# Patient Record
Sex: MALE | Race: OTHER | Employment: FULL TIME | URBAN - METROPOLITAN AREA
[De-identification: names, ages, dates, MRNs, and addresses within clinical notes are randomized per-mention and may not be internally consistent; named-entity substitution may affect disease eponyms.]

---

## 2017-01-12 ENCOUNTER — HOSPITAL ENCOUNTER (EMERGENCY)
Age: 52
Discharge: HOME | End: 2017-01-12
Attending: EMERGENCY MEDICINE
Payer: COMMERCIAL

## 2017-01-12 ENCOUNTER — APPOINTMENT (OUTPATIENT)
Dept: GENERAL RADIOLOGY | Age: 52
End: 2017-01-12
Attending: EMERGENCY MEDICINE
Payer: COMMERCIAL

## 2017-01-12 VITALS
WEIGHT: 190 LBS | HEART RATE: 69 BPM | TEMPERATURE: 97.8 F | BODY MASS INDEX: 31.62 KG/M2 | RESPIRATION RATE: 16 BRPM | SYSTOLIC BLOOD PRESSURE: 141 MMHG | OXYGEN SATURATION: 97 % | DIASTOLIC BLOOD PRESSURE: 83 MMHG

## 2017-01-12 DIAGNOSIS — R07.9 CHEST PAIN, UNSPECIFIED TYPE: Primary | ICD-10-CM

## 2017-01-12 LAB
ALBUMIN SERPL BCP-MCNC: 4 G/DL (ref 3.5–5)
ALBUMIN/GLOB SERPL: 1.2 {RATIO} (ref 1.2–3.5)
ALP SERPL-CCNC: 93 U/L (ref 50–136)
ALT SERPL-CCNC: 60 U/L (ref 12–65)
ANION GAP BLD CALC-SCNC: 5 MMOL/L (ref 7–16)
AST SERPL W P-5'-P-CCNC: 36 U/L (ref 15–37)
ATRIAL RATE: 76 BPM
BASOPHILS # BLD AUTO: 0.1 K/UL (ref 0–0.2)
BASOPHILS # BLD: 1 % (ref 0–2)
BILIRUB SERPL-MCNC: 0.5 MG/DL (ref 0.2–1.1)
BUN SERPL-MCNC: 13 MG/DL (ref 6–23)
CALCIUM SERPL-MCNC: 8.8 MG/DL (ref 8.3–10.4)
CALCULATED P AXIS, ECG09: 57 DEGREES
CALCULATED R AXIS, ECG10: 48 DEGREES
CALCULATED T AXIS, ECG11: 52 DEGREES
CHLORIDE SERPL-SCNC: 103 MMOL/L (ref 98–107)
CO2 SERPL-SCNC: 30 MMOL/L (ref 21–32)
CREAT SERPL-MCNC: 0.97 MG/DL (ref 0.8–1.5)
DIAGNOSIS, 93000: NORMAL
DIASTOLIC BP, ECG02: NORMAL MMHG
DIFFERENTIAL METHOD BLD: ABNORMAL
EOSINOPHIL # BLD: 0.8 K/UL (ref 0–0.8)
EOSINOPHIL NFR BLD: 12 % (ref 0.5–7.8)
ERYTHROCYTE [DISTWIDTH] IN BLOOD BY AUTOMATED COUNT: 12.5 % (ref 11.9–14.6)
GLOBULIN SER CALC-MCNC: 3.3 G/DL (ref 2.3–3.5)
GLUCOSE SERPL-MCNC: 106 MG/DL (ref 65–100)
HCT VFR BLD AUTO: 41.1 % (ref 41.1–50.3)
HGB BLD-MCNC: 14.7 G/DL (ref 13.6–17.2)
IMM GRANULOCYTES # BLD: 0 K/UL (ref 0–0.5)
IMM GRANULOCYTES NFR BLD AUTO: 0.1 % (ref 0–5)
LYMPHOCYTES # BLD AUTO: 34 % (ref 13–44)
LYMPHOCYTES # BLD: 2.4 K/UL (ref 0.5–4.6)
MCH RBC QN AUTO: 30.2 PG (ref 26.1–32.9)
MCHC RBC AUTO-ENTMCNC: 35.8 G/DL (ref 31.4–35)
MCV RBC AUTO: 84.4 FL (ref 79.6–97.8)
MONOCYTES # BLD: 0.6 K/UL (ref 0.1–1.3)
MONOCYTES NFR BLD AUTO: 8 % (ref 4–12)
NEUTS SEG # BLD: 3.1 K/UL (ref 1.7–8.2)
NEUTS SEG NFR BLD AUTO: 45 % (ref 43–78)
P-R INTERVAL, ECG05: 150 MS
PLATELET # BLD AUTO: 306 K/UL (ref 150–450)
PMV BLD AUTO: 10.1 FL (ref 10.8–14.1)
POTASSIUM SERPL-SCNC: 3.9 MMOL/L (ref 3.5–5.1)
PROT SERPL-MCNC: 7.3 G/DL (ref 6.3–8.2)
Q-T INTERVAL, ECG07: 378 MS
QRS DURATION, ECG06: 88 MS
QTC CALCULATION (BEZET), ECG08: 425 MS
RBC # BLD AUTO: 4.87 M/UL (ref 4.23–5.67)
SODIUM SERPL-SCNC: 138 MMOL/L (ref 136–145)
SYSTOLIC BP, ECG01: NORMAL MMHG
TROPONIN I SERPL-MCNC: <0.04 NG/ML (ref 0.02–0.05)
VENTRICULAR RATE, ECG03: 76 BPM
WBC # BLD AUTO: 7 K/UL (ref 4.3–11.1)

## 2017-01-12 PROCEDURE — 99285 EMERGENCY DEPT VISIT HI MDM: CPT | Performed by: EMERGENCY MEDICINE

## 2017-01-12 PROCEDURE — 71010 XR CHEST SNGL V: CPT

## 2017-01-12 PROCEDURE — 84484 ASSAY OF TROPONIN QUANT: CPT | Performed by: EMERGENCY MEDICINE

## 2017-01-12 PROCEDURE — 80053 COMPREHEN METABOLIC PANEL: CPT | Performed by: EMERGENCY MEDICINE

## 2017-01-12 PROCEDURE — 85025 COMPLETE CBC W/AUTO DIFF WBC: CPT | Performed by: EMERGENCY MEDICINE

## 2017-01-12 PROCEDURE — 93005 ELECTROCARDIOGRAM TRACING: CPT | Performed by: EMERGENCY MEDICINE

## 2017-01-12 RX ORDER — CLONIDINE HYDROCHLORIDE 0.1 MG/1
0.2 TABLET ORAL
Status: DISCONTINUED | OUTPATIENT
Start: 2017-01-12 | End: 2017-01-12 | Stop reason: HOSPADM

## 2017-01-12 NOTE — ED PROVIDER NOTES
HPI Comments: 45 yo male with a history of HTN who has been noncompliant with his medication presents to ED with intermittent left lateral chest/shoulder pain since yesterday. Patient describes \"poking\" pain that lasts a second or two and occurs every few minutes. Pain has eased up since last night. He reports left arm numbness with the pain. No other symptoms. No palpitations, diaphoresis, nausea/vomiting, or shortness of breath. No abdominal or back pain. No other focal numbness. No focal weakness. Patient is currently not having symptoms. He denies cardiac history. Patient is a 46 y.o. male presenting with chest pain. The history is provided by the patient and a relative. Chest Pain (Angina)    Associated symptoms include numbness. Pertinent negatives include no abdominal pain, no dizziness, no fever, no nausea, no palpitations, no shortness of breath, no vomiting and no weakness. Past Medical History:   Diagnosis Date    HTN (hypertension)        Past Surgical History:   Procedure Laterality Date    Hx shoulder arthroscopy Left          Family History:   Problem Relation Age of Onset    Stroke Mother     Hypertension Father        Social History     Social History    Marital status:      Spouse name: N/A    Number of children: N/A    Years of education: N/A     Occupational History    Not on file. Social History Main Topics    Smoking status: Never Smoker    Smokeless tobacco: Never Used    Alcohol use No    Drug use: No    Sexual activity: Yes     Partners: Female     Other Topics Concern    Not on file     Social History Narrative         ALLERGIES: Review of patient's allergies indicates no known allergies. Review of Systems   Constitutional: Negative for chills, fatigue and fever. HENT: Negative for congestion, rhinorrhea and sore throat. Eyes: Negative for pain and discharge. Respiratory: Negative for shortness of breath.     Cardiovascular: Positive for chest pain. Negative for palpitations and leg swelling. Gastrointestinal: Negative for abdominal pain, diarrhea, nausea and vomiting. Endocrine: Negative for cold intolerance and heat intolerance. Genitourinary: Negative for dysuria, flank pain and hematuria. Musculoskeletal: Negative for arthralgias, joint swelling and neck stiffness. Skin: Negative for pallor and wound. Allergic/Immunologic: Negative for environmental allergies. Neurological: Positive for numbness. Negative for dizziness, syncope, speech difficulty, weakness and light-headedness. Hematological: Negative for adenopathy. Psychiatric/Behavioral: Negative for suicidal ideas. Vitals:    01/12/17 1709   BP: (!) 159/110   Pulse: 79   Resp: 16   Temp: 97.8 °F (36.6 °C)   SpO2: 95%   Weight: 86.2 kg (190 lb)            Physical Exam   Constitutional: He is oriented to person, place, and time. He appears well-developed and well-nourished. No distress. HENT:   Head: Normocephalic and atraumatic. Mouth/Throat: Oropharynx is clear and moist.   Eyes: Conjunctivae and EOM are normal.   Neck: Normal range of motion. Neck supple. Cardiovascular: Normal rate, regular rhythm and intact distal pulses. Pulmonary/Chest: Effort normal and breath sounds normal. No respiratory distress. Abdominal: Soft. He exhibits no distension. There is no tenderness. Musculoskeletal: Normal range of motion. He exhibits no edema or tenderness. Neurological: He is alert and oriented to person, place, and time. Skin: Skin is warm and dry. Psychiatric: He has a normal mood and affect. MDM  Number of Diagnoses or Management Options  Diagnosis management comments: EKG NSR at rate of 76, no ST changes to suggest acute ischemia     CXR with no acute disease. Labs reviewed, no gross abnormality, troponin negative. Patient with atypical chest pain, HEART score of 2.   Patient offered admission for chest pain rule out but declines, would like to follow up with cardiology as outpatient. Will d/c home, prescription given for HTN medication.     ED Course       Procedures

## 2017-01-13 NOTE — DISCHARGE INSTRUCTIONS
Dolor de pecho: Instrucciones de cuidado - [ Chest Pain: Care Instructions ]  Instrucciones de cuidado  El dolor de pecho puede tener muchas causas. Algunas no son graves y mejorarán por sí solas en pocos días. Michelle algunos tipos de dolor de pecho requieren más pruebas y [place]. Es posible que yeboah médico le haya recomendado beth visita de seguimiento dentro de las 8 a 12 horas siguientes. Si no mejora, es posible que necesite [address] pruebas o [place]. Aunque yeboah médico le haya dado de shravan, es necesario que esté atento a cualquier problema que se presente. El médico le hizo un cuidadoso chequeo, michelle a veces los problemas pueden aparecer posteriormente. Si tiene nuevos síntomas o éstos no mejoran, obtenga atención médica de inmediato. Si tiene dolor o presión en el pecho que empeora o es diferente y que dura más de 5 minutos, o se desmayó (perdió el conocimiento), llame al 911 o busque otra ayuda de emergencia de inmediato. Acudir a beth consulta médica es sólo un paso en yeboah tratamiento. Aunque se sienta mejor, todavía deberá hacer lo que yeboah médico le recomiende, breanna asistir a todas las visitas de seguimiento sugeridas y jia los medicamentos exactamente [employer]	[place] fueron indicados. [place] le ayudará a recuperarse y prevenir problemas futuros. ¿Cómo puede cuidarse en el hogar? · Descanse hasta que se sienta mejor. · [place] jamal medicamentos exactamente breanna le fueron recetados. Llame a yeboah médico si jovana estar teniendo problemas con yeboah medicamento. · No conduzca después de jia un analgésico (medicamento para el dolor) recetado. ¿Cuándo debe pedir ayuda? Llame al 911 si:  · Se desmayó (perdió el conocimiento). · Tiene graves dificultades para respirar. · Tiene síntomas de un ataque al corazón. Estos podrían incluir:  ¨ Dolor o presión en el pecho, o beth sensación extraña en el pecho. ¨ Sudoración. ¨ Falta de aire. ¨ Náuseas o vómito.   ¨ Dolor, presión o beth sensación extraña en la espalda, el frank, la mandíbula, la parte superior del abdomen o en kimmy o ambos hombros o brazos. ¨ Aturdimiento o debilidad repentina. ¨ Latidos del corazón rápidos o irregulares. Después de llamar al 911, es posible que el operador le diga que mastique 1 aspirina para adultos o de 2 a 4 aspirinas de dosis baja. Espere a beth ambulancia. No intente conducir usted mismo. Llame hoy a yeboah médico si:  · Tiene cualquier dificultad para respirar. · El dolor en el pecho empeora. · Siente mareos o aturdimiento, o que está a punto de [place]. · No mejora breanna se esperaba. · Tiene dolor de pecho nuevo o diferente. ¿Dónde puede encontrar más información en inglés? Alvin Hull a http://dipak-laura.info/. Escriba A120 en la búsqueda para aprender más acerca de \"Dolor de pecho: Instrucciones de cuidado - [ Chest Pain: Care Instructions ]. \"  Revisado: 27 nicholas, 2016  Versión del contenido: 11.1  © 1159-3892 Healthwise, Incorporated. Las instrucciones de cuidado fueron adaptadas bajo licencia por [employer] (which disclaims liability or warranty for this information). Si usted tiene [place] afección médica o sobre estas instrucciones, siempre pregunte a yeboah profesional de bridget. Healthwise, Incorporated niega toda garantía o responsabilidad por yeboah uso de esta información.

## 2017-01-16 PROBLEM — R07.2 PRECORDIAL PAIN: Status: ACTIVE | Noted: 2017-01-16

## 2017-01-16 PROBLEM — Z82.49 FAMILY HISTORY OF ISCHEMIC HEART DISEASE (IHD): Status: ACTIVE | Noted: 2017-01-16

## 2017-01-16 PROBLEM — I10 ESSENTIAL HYPERTENSION: Status: ACTIVE | Noted: 2017-01-16

## 2017-02-12 PROBLEM — Z92.89 H/O DIAGNOSTIC TESTS: Status: ACTIVE | Noted: 2017-02-12

## 2017-03-22 ENCOUNTER — HOSPITAL ENCOUNTER (EMERGENCY)
Age: 52
Discharge: HOME | End: 2017-03-22
Attending: EMERGENCY MEDICINE
Payer: COMMERCIAL

## 2017-03-22 VITALS
DIASTOLIC BLOOD PRESSURE: 91 MMHG | TEMPERATURE: 100.3 F | BODY MASS INDEX: 32.82 KG/M2 | WEIGHT: 197 LBS | SYSTOLIC BLOOD PRESSURE: 149 MMHG | OXYGEN SATURATION: 94 % | RESPIRATION RATE: 17 BRPM | HEART RATE: 101 BPM | HEIGHT: 65 IN

## 2017-03-22 DIAGNOSIS — J11.1 FLU: Primary | ICD-10-CM

## 2017-03-22 LAB
FLUAV AG NPH QL IA: POSITIVE
FLUBV AG NPH QL IA: NEGATIVE

## 2017-03-22 PROCEDURE — 99282 EMERGENCY DEPT VISIT SF MDM: CPT | Performed by: EMERGENCY MEDICINE

## 2017-03-22 PROCEDURE — 74011250637 HC RX REV CODE- 250/637: Performed by: PHYSICIAN ASSISTANT

## 2017-03-22 PROCEDURE — 87804 INFLUENZA ASSAY W/OPTIC: CPT | Performed by: PHYSICIAN ASSISTANT

## 2017-03-22 RX ORDER — ACETAMINOPHEN 500 MG
1000 TABLET ORAL
Status: COMPLETED | OUTPATIENT
Start: 2017-03-22 | End: 2017-03-22

## 2017-03-22 RX ORDER — OSELTAMIVIR PHOSPHATE 75 MG/1
75 CAPSULE ORAL 2 TIMES DAILY
Qty: 10 CAP | Refills: 0 | Status: SHIPPED | OUTPATIENT
Start: 2017-03-22 | End: 2017-03-27

## 2017-03-22 RX ORDER — AMLODIPINE BESYLATE 5 MG/1
10 TABLET ORAL
Status: COMPLETED | OUTPATIENT
Start: 2017-03-22 | End: 2017-03-22

## 2017-03-22 RX ADMIN — ACETAMINOPHEN 1000 MG: 500 TABLET, FILM COATED ORAL at 09:43

## 2017-03-22 RX ADMIN — AMLODIPINE BESYLATE 10 MG: 5 TABLET ORAL at 09:53

## 2017-03-22 NOTE — ED TRIAGE NOTES
Pt states started coughing and feeling dizzy and having a headache yesterday morning. Pt states he had a fever last night of 102.

## 2017-03-22 NOTE — ED PROVIDER NOTES
Patient is a 46 y.o. male presenting with headaches and dizziness. The history is provided by the patient. Headache    This is a new problem. The current episode started yesterday. The problem occurs constantly. The problem has not changed since onset. Associated with: cough congestion, dizzy. The pain is located in the generalized region. The quality of the pain is described as dull. The pain is at a severity of 8/10. The pain is mild. Associated symptoms include a fever and dizziness. Pertinent negatives include no vomiting. Associated symptoms comments: Cough congestion . He has tried nothing for the symptoms. The treatment provided no relief. Dizziness   Associated symptoms include headaches. Pertinent negatives include no vomiting. Past Medical History:   Diagnosis Date    HTN (hypertension)        Past Surgical History:   Procedure Laterality Date    HX SHOULDER ARTHROSCOPY Left          Family History:   Problem Relation Age of Onset    Stroke Mother     Hypertension Father     Coronary Artery Disease Father        Social History     Social History    Marital status:      Spouse name: N/A    Number of children: N/A    Years of education: N/A     Occupational History    Not on file. Social History Main Topics    Smoking status: Never Smoker    Smokeless tobacco: Never Used    Alcohol use No    Drug use: No    Sexual activity: Yes     Partners: Female     Other Topics Concern    Not on file     Social History Narrative         ALLERGIES: Review of patient's allergies indicates no known allergies. Review of Systems   Constitutional: Positive for fever. Gastrointestinal: Negative for vomiting. Neurological: Positive for dizziness and headaches. All other systems reviewed and are negative.       Vitals:    03/22/17 0912   BP: (!) 165/92   Pulse: 98   Resp: 16   Temp: 99 °F (37.2 °C)   SpO2: 94%   Weight: 89.4 kg (197 lb)   Height: 5' 5\" (1.651 m)            Physical Exam Constitutional: He is oriented to person, place, and time. He appears well-developed and well-nourished. No distress. HENT:   Head: Normocephalic and atraumatic. Right Ear: External ear normal.   Left Ear: External ear normal.   Mild nasal congestion    Eyes: Conjunctivae and EOM are normal. Pupils are equal, round, and reactive to light. Neck: Normal range of motion. Neck supple. Cardiovascular: Normal rate, regular rhythm and normal heart sounds. Pulmonary/Chest: Effort normal and breath sounds normal. No respiratory distress. He has no wheezes. He has no rales. Abdominal: Soft. Bowel sounds are normal. There is no tenderness. There is no rebound and no guarding. Musculoskeletal: Normal range of motion. He exhibits no edema. Neurological: He is alert and oriented to person, place, and time. Skin: Skin is warm and dry. Psychiatric: He has a normal mood and affect. Nursing note and vitals reviewed.        MDM  Number of Diagnoses or Management Options  Diagnosis management comments: Flu a +,will treat, stressed follow up with pmd        Amount and/or Complexity of Data Reviewed  Clinical lab tests: ordered and reviewed  Review and summarize past medical records: yes    Risk of Complications, Morbidity, and/or Mortality  Presenting problems: moderate  Diagnostic procedures: moderate  Management options: low    Patient Progress  Patient progress: improved    ED Course       Procedures

## 2017-03-22 NOTE — DISCHARGE INSTRUCTIONS
Influenza (gripe): Instrucciones de cuidado - [ Influenza (Flu): Care Instructions ]  Instrucciones de cuidado  La influenza (gripe) es beth infección de los pulmones y las vías respiratorias. Es causada por el virus de la influenza. Hay diferentes cepas o tipos de virus de la gripe de un año a otro. A diferencia del resfriado común, la gripe se presenta de [place] repentina y [address], tales breanna tos, [place], [place], escalofríos, fatiga y [place], son más intensos. Estos síntomas pueden durar hasta 10 días. Aunque la gripe puede hacerle sentirse muy enfermo, por lo general no causa problemas serios de bridget. Por lo general, todo lo que necesita para los síntomas de la gripe es tratamiento en casa. Michelle yeboah médico podría recetarle algún medicamento antiviral para prevenir otros problemas de bridget, breanna la neumonía. [employer] y quienes tienen un problema de bridget prolongado, breanna beth enfermedad pulmonar, tienen el mayor riesgo de desarrollar neumonía u otros problemas de [place]. La atención de seguimiento es beth parte clave de yeboah tratamiento y seguridad. Asegúrese de hacer y acudir a todas las citas, y llame a yeboah médico si está teniendo problemas. También es beth buena idea saber los resultados de los exámenes y mantener beth lista de los medicamentos que zeny. ¿Cómo puede cuidarse en el hogar? · Descanse bastante. · Jory abundantes líquidos, suficientes para que yeboah orina sea de color [place] dwight o transparente breanna el agua. Si tiene beth enfermedad del riñón, del corazón o del hígado y tiene que [place] líquidos, hable con yeboah médico antes de aumentar yeboah consumo. · Si es necesario, tome un analgésico (medicamento para el dolor) de venta ty, breanna acetaminofén (Tylenol), ibuprofeno (Advil, Motrin) o naproxeno (Aleve), para [employer] fiebre, el dolor de [place] y los nazario musculares. Brittny y siga todas las instrucciones de la [place].  Ninguna persona alvaro de 20 años debe jia aspirina. Ésta ha sido relacionada con el síndrome de [place], beth enfermedad grave. · No fume. Fumar puede empeorar la gripe. Si necesita ayuda para dejar de fumar, hable con yeboah médico [employer] y medicamentos para dejar de fumar. Éstos pueden aumentar jamal probabilidades de dejar el hábito para siempre. · Para ayudar a despejar la nariz congestionada, respire aire húmedo de [place] caliente o un lavabo lleno de [place]. · Antes de usar medicamentos para la tos y los resfriados, revise la [place]. Estos medicamentos podrían no ser seguros para los niños pequeños o las personas con ciertos problemas de [place]. · Si le duele la piel alrededor de la nariz y los labios, aplique un poco de vaselina en la donya. · Para aliviar la tos:  Emilie Plaster líquidos para aliviar la comezón de garganta. ¨ Chupe pastillas para la tos o caramelos duros comunes. ¨ [place] un medicamento para la tos de venta ty que contenga dextrometorfano para ayudarle a dormir. Brittny y siga todas las instrucciones de la [place]. ¨ Use beth almohada extra en la noche para levantar más la luis. [place] podría ayudarlo a descansar si la tos lo mantiene despierto. · [employer] medicamentos recetados exactamente breanna le fueron recetados. Llame a yeboah médico si jovana estar teniendo problemas con yeboah medicamento. Cómo evitar propagar la gripe  · [employer] gentry con regularidad y manténgalas alejadas de yeboah tiffanie. · No vaya a la escuela, al [place] o a otros lugares públicos hasta que se sienta mejor y yeboah fiebre haya desaparecido por al menos 24 horas. La fiebre debe blossom desaparecido por sí misma, sin la ayuda de medicamentos. · Pida a las personas que viven con usted que hablen con jamal médicos sobre la prevención de la gripe. Michele vez les den algún medicamento antiviral para no contraer yeboah gripe. · Para prevenir tener la gripe en el futuro, hágase poner la vacuna contra la gripe cada otoño.  Anime a las personas que viven en yeboah casa a ponerse la vacuna. · Cúbrase la boca al toser o estornudar. ¿Cuándo debe pedir ayuda? Llame al 911 en cualquier momento que considere que necesita atención de emergencia. Por ejemplo, llame si:  · [address] dificultades para respirar. Llame a yeboah médico ahora mismo o busque atención médica inmediata si:  · Tiene nueva o mayor dificultad para respirar. · Le parece que está mucho más enfermo. · Se siente muy somnoliento (con sueño) o confuso. · Tiene fiebre nueva o más fabiana. · Tiene un salpullido nuevo. Preste especial atención a los cambios en yeboah bridget y asegúrese de comunicarse con yeboah médico si:  · Raya Rajendra a mejorar y después empeora otra vez. · No está mejorando después de 1 semana. ¿Dónde puede encontrar más información en inglés? Siri Charles a http://dipak-laura.info/. Fabiana Adrien [id number] en la búsqueda para aprender más acerca de \"Influenza (gripe): Instrucciones de cuidado - [ Influenza (Flu): Care Instructions ]. \"  Revisado: 23 nicholas, 2016  Versión del contenido: 11.1  © 2006-2016 Healthwise, Incorporated. Las instrucciones de cuidado fueron adaptadas bajo licencia por [employer] (which disclaims liability or warranty for this information). Si usted tiene [place] afección médica o sobre estas instrucciones, siempre pregunte a yeboah profesional de bridget. Healthwise, Incorporated niega toda garantía o responsabilidad por yeboah uso de esta información. Influenza (Flu): Care Instructions  Your Care Instructions  Influenza (flu) is an infection in the lungs and breathing passages. It is caused by the influenza virus. There are different strains, or types, of the flu virus from year to year. Unlike the common cold, the flu comes on suddenly and the symptoms, such as a cough, congestion, fever, chills, fatigue, aches, and pains, are more severe. These symptoms may last up to 10 days.  Although the flu can make you feel very sick, it usually doesn't cause serious health problems. Home treatment is usually all you need for flu symptoms. But your doctor may prescribe antiviral medicine to prevent other health problems, such as pneumonia, from developing. Older people and those who have a long-term health condition, such as lung disease, are most at risk for having pneumonia or other health problems. Follow-up care is a key part of your treatment and safety. Be sure to make and go to all appointments, and call your doctor if you are having problems. Its also a good idea to know your test results and keep a list of the medicines you take. How can you care for yourself at home? · Get plenty of rest.  · Drink plenty of fluids, enough so that your urine is light yellow or clear like water. If you have kidney, heart, or liver disease and have to limit fluids, talk with your doctor before you increase the amount of fluids you drink. · Take an over-the-counter pain medicine if needed, such as acetaminophen (Tylenol), ibuprofen (Advil, Motrin), or naproxen (Aleve), to relieve fever, headache, and muscle aches. Read and follow all instructions on the label. No one younger than 20 should take aspirin. It has been linked to Reye syndrome, a serious illness. · Do not smoke. Smoking can make the flu worse. If you need help quitting, talk to your doctor about stop-smoking programs and medicines. These can increase your chances of quitting for good. · Breathe moist air from a hot shower or from a sink filled with hot water to help clear a stuffy nose. · Before you use cough and cold medicines, check the label. These medicines may not be safe for young children or for people with certain health problems. · If the skin around your nose and lips becomes sore, put some petroleum jelly on the area. · To ease coughing:  ¨ Drink fluids to soothe a scratchy throat. ¨ Suck on cough drops or plain hard candy.   ¨ Take an over-the-counter cough medicine that contains dextromethorphan to help you get some sleep. Read and follow all instructions on the label. ¨ Raise your head at night with an extra pillow. This may help you rest if coughing keeps you awake. · Take any prescribed medicine exactly as directed. Call your doctor if you think you are having a problem with your medicine. To avoid spreading the flu  · Wash your hands regularly, and keep your hands away from your face. · Stay home from school, work, and other public places until you are feeling better and your fever has been gone for at least 24 hours. The fever needs to have gone away on its own without the help of medicine. · Ask people living with you to talk to their doctors about preventing the flu. They may get antiviral medicine to keep from getting the flu from you. · To prevent the flu in the future, get a flu vaccine every fall. Encourage people living with you to get the vaccine. · Cover your mouth when you cough or sneeze. When should you call for help? Call 911 anytime you think you may need emergency care. For example, call if:  · You have severe trouble breathing. Call your doctor now or seek immediate medical care if:  · You have new or worse trouble breathing. · You seem to be getting much sicker. · You feel very sleepy or confused. · You have a new or higher fever. · You get a new rash. Watch closely for changes in your health, and be sure to contact your doctor if:  · You begin to get better and then get worse. · You are not getting better after 1 week. Where can you learn more? Go to http://dipak-laura.info/. Enter Y776 in the search box to learn more about \"Influenza (Flu): Care Instructions. \"  Current as of: May 23, 2016  Content Version: 11.1  © 9946-5213 [employer]. Care instructions adapted under license by [employer] (which disclaims liability or warranty for this information).  If you have questions about a medical condition or this instruction, always ask your healthcare professional. [address] any warranty or liability for your use of this information.

## 2017-05-09 NOTE — ED TRIAGE NOTES
Pt reports left sided chest and shoulder pain that started yesterday. Pt reported feeling a tingling sensation down his left arm. Pt denies shortness of breath or nausea. Unable to assess

## 2017-07-04 ENCOUNTER — HOSPITAL ENCOUNTER (EMERGENCY)
Age: 52
Discharge: HOME | End: 2017-07-04
Attending: EMERGENCY MEDICINE
Payer: COMMERCIAL

## 2017-07-04 VITALS
RESPIRATION RATE: 18 BRPM | TEMPERATURE: 98.4 F | HEART RATE: 66 BPM | HEIGHT: 64 IN | OXYGEN SATURATION: 96 % | BODY MASS INDEX: 32.44 KG/M2 | DIASTOLIC BLOOD PRESSURE: 94 MMHG | WEIGHT: 190 LBS | SYSTOLIC BLOOD PRESSURE: 154 MMHG

## 2017-07-04 DIAGNOSIS — R42 DIZZINESS: Primary | ICD-10-CM

## 2017-07-04 DIAGNOSIS — R03.0 ELEVATED BLOOD PRESSURE READING: ICD-10-CM

## 2017-07-04 LAB
ALBUMIN SERPL BCP-MCNC: 3.7 G/DL (ref 3.5–5)
ALBUMIN/GLOB SERPL: 1 {RATIO} (ref 1.2–3.5)
ALP SERPL-CCNC: 82 U/L (ref 50–136)
ALT SERPL-CCNC: 48 U/L (ref 12–65)
ANION GAP BLD CALC-SCNC: 8 MMOL/L (ref 7–16)
AST SERPL W P-5'-P-CCNC: 30 U/L (ref 15–37)
ATRIAL RATE: 56 BPM
BASOPHILS # BLD AUTO: 0.1 K/UL (ref 0–0.2)
BASOPHILS # BLD: 1 % (ref 0–2)
BILIRUB SERPL-MCNC: 0.6 MG/DL (ref 0.2–1.1)
BUN SERPL-MCNC: 13 MG/DL (ref 6–23)
CALCIUM SERPL-MCNC: 8.8 MG/DL (ref 8.3–10.4)
CALCULATED P AXIS, ECG09: 43 DEGREES
CALCULATED R AXIS, ECG10: 44 DEGREES
CALCULATED T AXIS, ECG11: 46 DEGREES
CHLORIDE SERPL-SCNC: 104 MMOL/L (ref 98–107)
CO2 SERPL-SCNC: 29 MMOL/L (ref 21–32)
CREAT SERPL-MCNC: 0.85 MG/DL (ref 0.8–1.5)
DIAGNOSIS, 93000: NORMAL
DIFFERENTIAL METHOD BLD: ABNORMAL
EOSINOPHIL # BLD: 0.3 K/UL (ref 0–0.8)
EOSINOPHIL NFR BLD: 5 % (ref 0.5–7.8)
ERYTHROCYTE [DISTWIDTH] IN BLOOD BY AUTOMATED COUNT: 12.3 % (ref 11.9–14.6)
GLOBULIN SER CALC-MCNC: 3.7 G/DL (ref 2.3–3.5)
GLUCOSE SERPL-MCNC: 119 MG/DL (ref 65–100)
HCT VFR BLD AUTO: 42.1 % (ref 41.1–50.3)
HGB BLD-MCNC: 14.9 G/DL (ref 13.6–17.2)
IMM GRANULOCYTES # BLD: 0 K/UL (ref 0–0.5)
IMM GRANULOCYTES NFR BLD AUTO: 0.2 % (ref 0–5)
LYMPHOCYTES # BLD AUTO: 23 % (ref 13–44)
LYMPHOCYTES # BLD: 1.4 K/UL (ref 0.5–4.6)
MCH RBC QN AUTO: 29.8 PG (ref 26.1–32.9)
MCHC RBC AUTO-ENTMCNC: 35.4 G/DL (ref 31.4–35)
MCV RBC AUTO: 84.2 FL (ref 79.6–97.8)
MONOCYTES # BLD: 0.3 K/UL (ref 0.1–1.3)
MONOCYTES NFR BLD AUTO: 5 % (ref 4–12)
NEUTS SEG # BLD: 3.9 K/UL (ref 1.7–8.2)
NEUTS SEG NFR BLD AUTO: 66 % (ref 43–78)
P-R INTERVAL, ECG05: 164 MS
PLATELET # BLD AUTO: 288 K/UL (ref 150–450)
PMV BLD AUTO: 10 FL (ref 10.8–14.1)
POTASSIUM SERPL-SCNC: 4.1 MMOL/L (ref 3.5–5.1)
PROT SERPL-MCNC: 7.4 G/DL (ref 6.3–8.2)
Q-T INTERVAL, ECG07: 434 MS
QRS DURATION, ECG06: 84 MS
QTC CALCULATION (BEZET), ECG08: 418 MS
RBC # BLD AUTO: 5 M/UL (ref 4.23–5.67)
SODIUM SERPL-SCNC: 141 MMOL/L (ref 136–145)
VENTRICULAR RATE, ECG03: 56 BPM
WBC # BLD AUTO: 5.9 K/UL (ref 4.3–11.1)

## 2017-07-04 PROCEDURE — 74011250637 HC RX REV CODE- 250/637: Performed by: EMERGENCY MEDICINE

## 2017-07-04 PROCEDURE — 85025 COMPLETE CBC W/AUTO DIFF WBC: CPT | Performed by: EMERGENCY MEDICINE

## 2017-07-04 PROCEDURE — 99284 EMERGENCY DEPT VISIT MOD MDM: CPT | Performed by: EMERGENCY MEDICINE

## 2017-07-04 PROCEDURE — 93005 ELECTROCARDIOGRAM TRACING: CPT | Performed by: EMERGENCY MEDICINE

## 2017-07-04 PROCEDURE — 80053 COMPREHEN METABOLIC PANEL: CPT | Performed by: EMERGENCY MEDICINE

## 2017-07-04 RX ORDER — PROMETHAZINE HYDROCHLORIDE 25 MG/1
25 TABLET ORAL
Qty: 12 TAB | Refills: 0 | Status: SHIPPED | OUTPATIENT
Start: 2017-07-04 | End: 2018-04-18

## 2017-07-04 RX ORDER — SODIUM CHLORIDE 0.9 % (FLUSH) 0.9 %
5-10 SYRINGE (ML) INJECTION EVERY 8 HOURS
Status: DISCONTINUED | OUTPATIENT
Start: 2017-07-04 | End: 2017-07-04 | Stop reason: HOSPADM

## 2017-07-04 RX ORDER — SODIUM CHLORIDE 0.9 % (FLUSH) 0.9 %
5-10 SYRINGE (ML) INJECTION AS NEEDED
Status: DISCONTINUED | OUTPATIENT
Start: 2017-07-04 | End: 2017-07-04 | Stop reason: HOSPADM

## 2017-07-04 RX ORDER — AMLODIPINE BESYLATE 10 MG/1
10 TABLET ORAL DAILY
Qty: 14 TAB | Refills: 0 | Status: SHIPPED | OUTPATIENT
Start: 2017-07-04 | End: 2018-04-18 | Stop reason: SDUPTHER

## 2017-07-04 RX ORDER — AMLODIPINE BESYLATE 5 MG/1
5 TABLET ORAL
Status: COMPLETED | OUTPATIENT
Start: 2017-07-04 | End: 2017-07-04

## 2017-07-04 RX ADMIN — AMLODIPINE BESYLATE 5 MG: 5 TABLET ORAL at 14:01

## 2017-07-04 NOTE — DISCHARGE INSTRUCTIONS
May try increased dosage of blood pressure medication. Record her blood pressure in the night in the morning for the next few days and recheck with your doctor. Recheck sooner for weakness and numbness in arm or leg or face. Recheck for syncope or shortness of breath or chest pain. Elevated Blood Pressure: Care Instructions  Your Care Instructions    Blood pressure is a measure of how hard the blood pushes against the walls of your arteries. It's normal for blood pressure to go up and down throughout the day. But if it stays up over time, you have high blood pressure. Two numbers tell you your blood pressure. The first number is the systolic pressure. It shows how hard the blood pushes when your heart is pumping. The second number is the diastolic pressure. It shows how hard the blood pushes between heartbeats, when your heart is relaxed and filling with blood. An ideal blood pressure in adults is less than 120/80 (say \"120 over 80\"). High blood pressure is 140/90 or higher. You have high blood pressure if your top number is 140 or higher or your bottom number is 90 or higher, or both. The main test for high blood pressure is simple, fast, and painless. To diagnose high blood pressure, your doctor will test your blood pressure at different times. After testing your blood pressure, your doctor may ask you to test it again when you are home. If you are diagnosed with high blood pressure, you can work with your doctor to make a long-term plan to manage it. Follow-up care is a key part of your treatment and safety. Be sure to make and go to all appointments, and call your doctor if you are having problems. It's also a good idea to know your test results and keep a list of the medicines you take. How can you care for yourself at home? · Do not smoke. Smoking increases your risk for heart attack and stroke. If you need help quitting, talk to your doctor about stop-smoking programs and medicines.  These can increase your chances of quitting for good. · Stay at a healthy weight. · Try to limit how much sodium you eat to less than 2,300 milligrams (mg) a day. Your doctor may ask you to try to eat less than 1,500 mg a day. · Be physically active. Get at least 30 minutes of exercise on most days of the week. Walking is a good choice. You also may want to do other activities, such as running, swimming, cycling, or playing tennis or team sports. · Avoid or limit alcohol. Talk to your doctor about whether you can drink any alcohol. · Eat plenty of fruits, vegetables, and low-fat dairy products. Eat less saturated and total fats. · Learn how to check your blood pressure at home. When should you call for help? Call your doctor now or seek immediate medical care if:  · Your blood pressure is much higher than normal (such as 180/110 or higher). · You think high blood pressure is causing symptoms such as:  ¨ Severe headache. ¨ Blurry vision. Watch closely for changes in your health, and be sure to contact your doctor if:  · You do not get better as expected. Where can you learn more? Go to http://dipak-laura.info/. Enter J108 in the search box to learn more about \"Elevated Blood Pressure: Care Instructions. \"  Current as of: April 3, 2017  Content Version: 11.3  © 8159-2046 [employer]. Care instructions adapted under license by [employer] (which disclaims liability or warranty for this information). If you have questions about a medical condition or this instruction, always ask your healthcare professional. [place] [address] any warranty or liability for your use of this information. Dizziness: Care Instructions  Your Care Instructions  Dizziness is the feeling of unsteadiness or fuzziness in your head. It is different than having vertigo, which is a feeling that the room is spinning or that you are moving or falling.  It is also different from lightheadedness, which is the feeling that you are about to faint. It can be hard to know what causes dizziness. Some people feel dizzy when they have migraine headaches. Sometimes bouts of flu can make you feel dizzy. Some medical conditions, such as heart problems or high blood pressure, can make you feel dizzy. Many medicines can cause dizziness, including medicines for high blood pressure, pain, or anxiety. If a medicine causes your symptoms, your doctor may recommend that you stop or change the medicine. If it is a problem with your heart, you may need medicine to help your heart work better. If there is no clear reason for your symptoms, your doctor may suggest watching and waiting for a while to see if the dizziness goes away on its own. Follow-up care is a key part of your treatment and safety. Be sure to make and go to all appointments, and call your doctor if you are having problems. It's also a good idea to know your test results and keep a list of the medicines you take. How can you care for yourself at home? · If your doctor recommends or prescribes medicine, take it exactly as directed. Call your doctor if you think you are having a problem with your medicine. · Do not drive while you feel dizzy. · Try to prevent falls. Steps you can take include:  ¨ Using nonskid mats, adding grab bars near the tub, and using night-lights. ¨ Clearing your home so that walkways are free of anything you might trip on. ¨ Letting family and friends know that you have been feeling dizzy. This will help them know how to help you. When should you call for help? Call 911 anytime you think you may need emergency care. For example, call if:  · You passed out (lost consciousness). · You have dizziness along with symptoms of a heart attack. These may include:  ¨ Chest pain or pressure, or a strange feeling in the chest.  ¨ Sweating. ¨ Shortness of breath. ¨ Nausea or vomiting.   ¨ Pain, pressure, or a strange feeling in the back, neck, jaw, or upper belly or in one or both shoulders or arms. ¨ Lightheadedness or sudden weakness. ¨ A fast or irregular heartbeat. · You have symptoms of a stroke. These may include:  ¨ Sudden numbness, tingling, weakness, or loss of movement in your face, arm, or leg, especially on only one side of your body. ¨ Sudden vision changes. ¨ Sudden trouble speaking. ¨ Sudden confusion or trouble understanding simple statements. ¨ Sudden problems with walking or balance. ¨ A sudden, severe headache that is different from past headaches. Call your doctor now or seek immediate medical care if:  · You feel dizzy and have a fever, headache, or ringing in your ears. · You have new or increased nausea and vomiting. · Your dizziness does not go away or comes back. Watch closely for changes in your health, and be sure to contact your doctor if:  · You do not get better as expected. Where can you learn more? Go to http://dipak-laura.info/. Enter Z143 in the search box to learn more about \"Dizziness: Care Instructions. \"  Current as of: March 20, 2017  Content Version: 11.3  © 5420-8244 [employer]. Care instructions adapted under license by [employer] (which disclaims liability or warranty for this information). If you have questions about a medical condition or this instruction, always ask your healthcare professional. [place] [address] any warranty or liability for your use of this information.

## 2017-07-04 NOTE — ED PROVIDER NOTES
HPI Comments: 57-year-old gentleman history of hypertension complains of some dizziness with frontal headache and nausea this morning. Worse when he stands up. Noted his blood pressure to be it with diastolics in the 321N range. No vomiting no fever no focal numbness or weakness. No syncope. Patient is a 45 y.o. male presenting with hypertension. The history is provided by the patient. Hypertension    This is a new problem. The current episode started 6 to 12 hours ago. The problem has not changed since onset. Associated symptoms include headaches. Pertinent negatives include no chest pain, no palpitations, no blurred vision, no neck pain, no peripheral edema, no dizziness, no shortness of breath, no nausea and no vomiting. There are no associated agents to hypertension. Risk factors include no risk factors. Past Medical History:   Diagnosis Date    HTN (hypertension)        Past Surgical History:   Procedure Laterality Date    HX SHOULDER ARTHROSCOPY Left          Family History:   Problem Relation Age of Onset    Stroke Mother     Hypertension Father     Coronary Artery Disease Father        Social History     Social History    Marital status:      Spouse name: N/A    Number of children: N/A    Years of education: N/A     Occupational History    Not on file. Social History Main Topics    Smoking status: Never Smoker    Smokeless tobacco: Never Used    Alcohol use No    Drug use: No    Sexual activity: Yes     Partners: Female     Other Topics Concern    Not on file     Social History Narrative         ALLERGIES: Review of patient's allergies indicates no known allergies. Review of Systems   Constitutional: Negative for chills and fever. HENT: Negative for ear pain, sinus pressure and sore throat. Eyes: Negative for blurred vision, pain and visual disturbance. Respiratory: Negative for cough and shortness of breath.     Cardiovascular: Negative for chest pain and palpitations. Gastrointestinal: Negative for nausea and vomiting. Musculoskeletal: Negative for back pain, gait problem, myalgias, neck pain and neck stiffness. Skin: Negative for color change and rash. Neurological: Positive for headaches. Negative for dizziness, syncope, weakness and numbness. All other systems reviewed and are negative. Vitals:    07/04/17 1214 07/04/17 1219   BP: 137/84 (!) 178/92   Pulse: 66    Resp: 16    Temp: 98.4 °F (36.9 °C)    SpO2: 100% 98%   Weight: 86.2 kg (190 lb)    Height: 5' 4\" (1.626 m)             Physical Exam   Constitutional: He is oriented to person, place, and time. He appears well-developed and well-nourished. No distress. HENT:   Head: Normocephalic and atraumatic. Mouth/Throat: Oropharynx is clear and moist. No oropharyngeal exudate. Eyes: Conjunctivae and EOM are normal. Pupils are equal, round, and reactive to light. Neck: Normal range of motion. Neck supple. Cardiovascular: Normal rate, regular rhythm and intact distal pulses. No murmur heard. Pulmonary/Chest: Breath sounds normal. No respiratory distress. Abdominal: No hernia. Neurological: He is alert and oriented to person, place, and time. Gait normal.   Nl speech. No drift. Normal finger-nose testing. Skin: Skin is warm and dry. Psychiatric: He has a normal mood and affect. His speech is normal.   Nursing note and vitals reviewed. MDM  Number of Diagnoses or Management Options  Diagnosis management comments: Do not see any obvious evidence of stroke. No signs of infection or anemia and diabetes. Will light increased patient's blood pressure medication to 10 mg and have him follow-up with his doctor.        Amount and/or Complexity of Data Reviewed  Clinical lab tests: ordered and reviewed    Risk of Complications, Morbidity, and/or Mortality  Presenting problems: moderate  Diagnostic procedures: minimal  Management options: low      ED Course Procedures      Results Include:    Recent Results (from the past 24 hour(s))   CBC WITH AUTOMATED DIFF    Collection Time: 07/04/17 12:37 PM   Result Value Ref Range    WBC 5.9 4.3 - 11.1 K/uL    RBC 5.00 4.23 - 5.67 M/uL    HGB 14.9 13.6 - 17.2 g/dL    HCT 42.1 41.1 - 50.3 %    MCV 84.2 79.6 - 97.8 FL    MCH 29.8 26.1 - 32.9 PG    MCHC 35.4 (H) 31.4 - 35.0 g/dL    RDW 12.3 11.9 - 14.6 %    PLATELET 312 796 - 205 K/uL    MPV 10.0 (L) 10.8 - 14.1 FL    DF AUTOMATED      NEUTROPHILS 66 43 - 78 %    LYMPHOCYTES 23 13 - 44 %    MONOCYTES 5 4.0 - 12.0 %    EOSINOPHILS 5 0.5 - 7.8 %    BASOPHILS 1 0.0 - 2.0 %    IMMATURE GRANULOCYTES 0.2 0.0 - 5.0 %    ABS. NEUTROPHILS 3.9 1.7 - 8.2 K/UL    ABS. LYMPHOCYTES 1.4 0.5 - 4.6 K/UL    ABS. MONOCYTES 0.3 0.1 - 1.3 K/UL    ABS. EOSINOPHILS 0.3 0.0 - 0.8 K/UL    ABS. BASOPHILS 0.1 0.0 - 0.2 K/UL    ABS. IMM. GRANS. 0.0 0.0 - 0.5 K/UL   METABOLIC PANEL, COMPREHENSIVE    Collection Time: 07/04/17 12:37 PM   Result Value Ref Range    Sodium 141 136 - 145 mmol/L    Potassium 4.1 3.5 - 5.1 mmol/L    Chloride 104 98 - 107 mmol/L    CO2 29 21 - 32 mmol/L    Anion gap 8 7 - 16 mmol/L    Glucose 119 (H) 65 - 100 mg/dL    BUN 13 6 - 23 MG/DL    Creatinine 0.85 0.8 - 1.5 MG/DL    GFR est AA >60 >60 ml/min/1.73m2    GFR est non-AA >60 >60 ml/min/1.73m2    Calcium 8.8 8.3 - 10.4 MG/DL    Bilirubin, total 0.6 0.2 - 1.1 MG/DL    ALT (SGPT) 48 12 - 65 U/L    AST (SGOT) 30 15 - 37 U/L    Alk.  phosphatase 82 50 - 136 U/L    Protein, total 7.4 6.3 - 8.2 g/dL    Albumin 3.7 3.5 - 5.0 g/dL    Globulin 3.7 (H) 2.3 - 3.5 g/dL    A-G Ratio 1.0 (L) 1.2 - 3.5

## 2017-07-04 NOTE — ED TRIAGE NOTES
PMD-Dr Joyce Connor. Pt felt dizzy when he stood from waking this morning C/o headache. Diastolic BP was high on 2 separate readings this morning per wife. 145/103 and 153/102. BP WNL in triage. Neuro intact. Speech is clear.

## 2018-04-18 PROBLEM — J30.1 SEASONAL ALLERGIC RHINITIS DUE TO POLLEN: Status: ACTIVE | Noted: 2018-04-18

## 2018-04-18 PROBLEM — E66.01 SEVERE OBESITY (BMI 35.0-39.9) WITH COMORBIDITY (HCC): Status: ACTIVE | Noted: 2018-04-18

## 2018-06-14 PROBLEM — Z00.00 ROUTINE HEALTH MAINTENANCE: Status: ACTIVE | Noted: 2018-06-14

## 2018-06-14 PROBLEM — R73.03 PREDIABETES: Status: ACTIVE | Noted: 2018-06-14

## 2018-11-25 ENCOUNTER — HOSPITAL ENCOUNTER (EMERGENCY)
Age: 53
Discharge: HOME | End: 2018-11-25
Attending: EMERGENCY MEDICINE
Payer: COMMERCIAL

## 2018-11-25 VITALS
SYSTOLIC BLOOD PRESSURE: 164 MMHG | HEIGHT: 65 IN | OXYGEN SATURATION: 96 % | BODY MASS INDEX: 33.32 KG/M2 | DIASTOLIC BLOOD PRESSURE: 89 MMHG | TEMPERATURE: 99.2 F | RESPIRATION RATE: 16 BRPM | HEART RATE: 87 BPM | WEIGHT: 200 LBS

## 2018-11-25 DIAGNOSIS — R03.0 ELEVATED BLOOD PRESSURE READING: ICD-10-CM

## 2018-11-25 DIAGNOSIS — J01.90 ACUTE NON-RECURRENT SINUSITIS, UNSPECIFIED LOCATION: Primary | ICD-10-CM

## 2018-11-25 PROCEDURE — 74011250637 HC RX REV CODE- 250/637: Performed by: EMERGENCY MEDICINE

## 2018-11-25 PROCEDURE — 99284 EMERGENCY DEPT VISIT MOD MDM: CPT | Performed by: EMERGENCY MEDICINE

## 2018-11-25 RX ORDER — LISINOPRIL 5 MG/1
10 TABLET ORAL ONCE
Status: COMPLETED | OUTPATIENT
Start: 2018-11-25 | End: 2018-11-25

## 2018-11-25 RX ORDER — HYDROCODONE BITARTRATE AND ACETAMINOPHEN 7.5; 325 MG/1; MG/1
1 TABLET ORAL
Status: COMPLETED | OUTPATIENT
Start: 2018-11-25 | End: 2018-11-25

## 2018-11-25 RX ORDER — AZITHROMYCIN 250 MG/1
TABLET, FILM COATED ORAL
Qty: 6 TAB | Refills: 0 | Status: SHIPPED | OUTPATIENT
Start: 2018-11-25 | End: 2018-12-11

## 2018-11-25 RX ORDER — BUTALBITAL, ACETAMINOPHEN AND CAFFEINE 300; 40; 50 MG/1; MG/1; MG/1
1 CAPSULE ORAL
Qty: 8 CAP | Refills: 0 | Status: SHIPPED | OUTPATIENT
Start: 2018-11-25 | End: 2019-05-21

## 2018-11-25 RX ADMIN — LISINOPRIL 10 MG: 5 TABLET ORAL at 18:12

## 2018-11-25 RX ADMIN — HYDROCODONE BITARTRATE AND ACETAMINOPHEN 1 TABLET: 7.5; 325 TABLET ORAL at 17:31

## 2018-11-25 NOTE — ED NOTES
I have reviewed discharge instructions with the patient. The patient verbalized understanding. Patient left ED via Discharge Method: ambulatory to Home with self. Opportunity for questions and clarification provided. Patient given 2 scripts. Work note provided. To continue your aftercare when you leave the hospital, you may receive an automated call from our care team to check in on how you are doing. This is a free service and part of our promise to provide the best care and service to meet your aftercare needs.  If you have questions, or wish to unsubscribe from this service please call [phone]. Thank you for Choosing our [facility] Emergency Department.

## 2018-11-25 NOTE — ED NOTES
Patient has been using OTC Cold remedies without knowledge of effect on HTN. Pt is current on HTN meds.

## 2018-11-25 NOTE — LETTER
[facility] EMERGENCY DEPT 
[address] [place] [id number] [phone] Work/School Note Date: 11/25/2018 To Whom It May concern: 
 
Bud Cee was seen and treated today in the emergency room by the following provider(s): 
Attending Provider: Rafy Osorio MD. Bud Cee may return to work on 11/27/2018. Sincerely, Lucina Plummer RN

## 2018-11-25 NOTE — ED PROVIDER NOTES
55-year-old female 4 day history of cough. Some green drainage. Both coughing it up and nasal drainage as well. Gradual onset of frontal headache with some chills and fever of the last 24 hours. His nose is blood pressure being elevated. Now running about 160/105. No vomiting or change in vision. No ataxia. No focal numbness or weakness. No syncope or rash or stiff neck. No photophobia. No trauma. States gets headaches on occasion with blood pressure elevates and this seems very similar. Does have some pain in his teeth along with congestion The history is provided by the patient. Hypertension This is a new problem. The current episode started yesterday. The problem has not changed since onset. Associated symptoms include headaches. Pertinent negatives include no chest pain, no blurred vision, no neck pain, no dizziness, no shortness of breath, no nausea and no vomiting. Past Medical History:  
Diagnosis Date  
 HTN (hypertension)  Prediabetes 6/14/2018 Past Surgical History:  
Procedure Laterality Date  HX SHOULDER ARTHROSCOPY Left Family History:  
Problem Relation Age of Onset  Stroke Mother  Hypertension Father  Coronary Artery Disease Father Social History Socioeconomic History  Marital status:  Spouse name: Not on file  Number of children: Not on file  Years of education: Not on file  Highest education level: Not on file Social Needs  Financial resource strain: Not on file  Food insecurity - worry: Not on file  Food insecurity - inability: Not on file  Transportation needs - medical: Not on file  Transportation needs - non-medical: Not on file Occupational History  Not on file Tobacco Use  Smoking status: Never Smoker  Smokeless tobacco: Never Used Substance and Sexual Activity  Alcohol use: No  
 Drug use: No  
 Sexual activity: Yes  
  Partners: Female Other Topics Concern  Not on file Social History Narrative  Not on file ALLERGIES: Patient has no known allergies. Review of Systems Constitutional: Negative for chills and fever. HENT: Positive for postnasal drip. Negative for ear pain, sinus pressure, sore throat and trouble swallowing. Eyes: Negative for blurred vision, pain and visual disturbance. Respiratory: Negative for cough and shortness of breath. Cardiovascular: Negative for chest pain. Gastrointestinal: Negative for nausea and vomiting. Musculoskeletal: Negative for back pain, gait problem, myalgias, neck pain and neck stiffness. Skin: Negative for color change and rash. Neurological: Positive for headaches. Negative for dizziness, syncope, weakness and numbness. All other systems reviewed and are negative. Vitals:  
 11/25/18 1632 11/25/18 1649 11/25/18 1652 BP: (!) 155/93 (!) 159/95 Pulse: 100 93 Resp: 16 Temp: 99.2 °F (37.3 °C) SpO2: 98% 96% 98% Weight: 90.7 kg (200 lb) Height: 5' 5\" (1.651 m) Physical Exam  
Constitutional: He is oriented to person, place, and time. He appears well-developed and well-nourished. No distress. HENT:  
Head: Normocephalic and atraumatic. Right Ear: External ear normal.  
Left Ear: External ear normal.  
Nose: Mucosal edema present. Right sinus exhibits maxillary sinus tenderness. Left sinus exhibits maxillary sinus tenderness. Mouth/Throat: Oropharynx is clear and moist. No oropharyngeal exudate. Eyes: Conjunctivae and EOM are normal. Pupils are equal, round, and reactive to light. Neck: Normal range of motion. Neck supple. No Brudzinski's sign and no Kernig's sign noted. Cardiovascular: Normal rate, regular rhythm and intact distal pulses. No murmur heard. Pulmonary/Chest: Breath sounds normal. No respiratory distress. Neurological: He is alert and oriented to person, place, and time.  Gait normal. GCS eye subscore is 4. GCS verbal subscore is 5. GCS motor subscore is 6. Reflex Scores: 
     Bicep reflexes are 2+ on the right side and 2+ on the left side. Patellar reflexes are 2+ on the right side and 2+ on the left side. Nl speech No drift. Normal finger-nose testing. Skin: Skin is warm and dry. Psychiatric: He has a normal mood and affect. His speech is normal.  
Nursing note and vitals reviewed. MDM Number of Diagnoses or Management Options Diagnosis management comments: No evidence for meningitis nor stroke. Suspect sinusitis with elevated blood pressure due to pain Risk of Complications, Morbidity, and/or Mortality Presenting problems: moderate Diagnostic procedures: minimal 
Management options: low Patient Progress Patient progress: stable Procedures

## 2018-11-25 NOTE — DISCHARGE INSTRUCTIONS
Blood pressure temporarily elevated I believe due to pain and infection. He do not need to change her blood pressure medications at this point. Recheck with your doctor 2-3 days if he did not feel better. Recheck sooner for rash, stiff neck, passing out, persistent vomiting or weakness in arm or leg. Take antibiotic until completed. Do not take any decongestants. May try salt water nose drops to help out with congestion. Sinusitis: Care Instructions  Your Care Instructions    Sinusitis is an infection of the lining of the sinus cavities in your head. Sinusitis often follows a cold. It causes pain and pressure in your head and face. In most cases, sinusitis gets better on its own in 1 to 2 weeks. But some mild symptoms may last for several weeks. Sometimes antibiotics are needed. Follow-up care is a key part of your treatment and safety. Be sure to make and go to all appointments, and call your doctor if you are having problems. It's also a good idea to know your test results and keep a list of the medicines you take. How can you care for yourself at home? · Take an over-the-counter pain medicine, such as acetaminophen (Tylenol), ibuprofen (Advil, Motrin), or naproxen (Aleve). Read and follow all instructions on the label. · If the doctor prescribed antibiotics, take them as directed. Do not stop taking them just because you feel better. You need to take the full course of antibiotics. · Be careful when taking over-the-counter cold or flu medicines and Tylenol at the same time. Many of these medicines have acetaminophen, which is Tylenol. Read the labels to make sure that you are not taking more than the recommended dose. Too much acetaminophen (Tylenol) can be harmful. · Breathe warm, moist air from a steamy shower, a hot bath, or a sink filled with hot water. Avoid cold, dry air. Using a humidifier in your home may help. Follow the directions for cleaning the machine.   · Use saline (saltwater) nasal washes to help keep your nasal passages open and wash out mucus and bacteria. You can buy saline nose drops at a grocery store or drugstore. Or you can make your own at home by adding 1 teaspoon of salt and 1 teaspoon of baking soda to 2 cups of distilled water. If you make your own, fill a bulb syringe with the solution, insert the tip into your nostril, and squeeze gently. Jacklynn Downs your nose. · Put a hot, wet towel or a warm gel pack on your face 3 or 4 times a day for 5 to 10 minutes each time. · Try a decongestant nasal spray like oxymetazoline (Afrin). Do not use it for more than 3 days in a row. Using it for more than 3 days can make your congestion worse. When should you call for help? Call your doctor now or seek immediate medical care if:    · You have new or worse swelling or redness in your face or around your eyes.     · You have a new or higher fever.    Watch closely for changes in your health, and be sure to contact your doctor if:    · You have new or worse facial pain.     · The mucus from your nose becomes thicker (like pus) or has new blood in it.     · You are not getting better as expected. Where can you learn more? Go to http://dipak-laura.info/. Enter F268 in the search box to learn more about \"Sinusitis: Care Instructions. \"  Current as of: March 28, 2018  Content Version: 11.8  © 3434-0785 [employer]. Care instructions adapted under license by [employer] (which disclaims liability or warranty for this information). If you have questions about a medical condition or this instruction, always ask your healthcare professional. [place] [address] any warranty or liability for your use of this information.

## 2019-04-10 ENCOUNTER — HOSPITAL ENCOUNTER (OUTPATIENT)
Dept: LAB | Age: 54
Discharge: HOME | End: 2019-04-10

## 2019-04-10 PROCEDURE — 88305 TISSUE EXAM BY PATHOLOGIST: CPT

## 2020-02-13 PROBLEM — K61.1 PERIRECTAL ABSCESS: Status: ACTIVE | Noted: 2020-02-13

## 2020-05-21 PROBLEM — K61.1 PERIRECTAL ABSCESS: Status: RESOLVED | Noted: 2020-02-13 | Resolved: 2020-05-21

## 2020-09-28 NOTE — LETTER
[facility] EMERGENCY DEPT 
[address] [place] [id number] 
[phone] Work/School Note Date: 3/22/2017 To Whom It May concern: 
 
Jt Wong was seen and treated today in the emergency room by the following provider(s): 
Attending Provider: Romelia White MD 
Physician Assistant: SOFI Broderick. Jt Wong may return to work on 3-26-17. Sincerely, SOFI Broderick 
 
 
 
 No respiratory distress. No stridor, Lungs sounds clear with good aeration bilaterally.

## 2021-05-27 ENCOUNTER — APPOINTMENT (OUTPATIENT)
Dept: CT IMAGING | Age: 56
End: 2021-05-27
Attending: EMERGENCY MEDICINE
Payer: COMMERCIAL

## 2021-05-27 ENCOUNTER — HOSPITAL ENCOUNTER (EMERGENCY)
Age: 56
Discharge: HOME | End: 2021-05-27
Attending: EMERGENCY MEDICINE
Payer: COMMERCIAL

## 2021-05-27 VITALS
SYSTOLIC BLOOD PRESSURE: 132 MMHG | HEIGHT: 65 IN | HEART RATE: 75 BPM | OXYGEN SATURATION: 98 % | WEIGHT: 200 LBS | DIASTOLIC BLOOD PRESSURE: 84 MMHG | BODY MASS INDEX: 33.32 KG/M2 | RESPIRATION RATE: 14 BRPM | TEMPERATURE: 98.5 F

## 2021-05-27 DIAGNOSIS — H91.92 DEAFNESS IN LEFT EAR: ICD-10-CM

## 2021-05-27 DIAGNOSIS — R51.9 ACUTE NONINTRACTABLE HEADACHE, UNSPECIFIED HEADACHE TYPE: Primary | ICD-10-CM

## 2021-05-27 LAB
ANION GAP SERPL CALC-SCNC: 10 MMOL/L (ref 7–16)
ATRIAL RATE: 83 BPM
BUN SERPL-MCNC: 18 MG/DL (ref 6–23)
CALCIUM SERPL-MCNC: 8.8 MG/DL (ref 8.3–10.4)
CALCULATED P AXIS, ECG09: 65 DEGREES
CALCULATED R AXIS, ECG10: 62 DEGREES
CALCULATED T AXIS, ECG11: 39 DEGREES
CHLORIDE SERPL-SCNC: 108 MMOL/L (ref 98–107)
CO2 SERPL-SCNC: 26 MMOL/L (ref 21–32)
CREAT SERPL-MCNC: 0.9 MG/DL (ref 0.8–1.5)
DIAGNOSIS, 93000: NORMAL
ERYTHROCYTE [DISTWIDTH] IN BLOOD BY AUTOMATED COUNT: 12.4 % (ref 11.9–14.6)
GLUCOSE SERPL-MCNC: 136 MG/DL (ref 65–100)
HCT VFR BLD AUTO: 43.4 % (ref 41.1–50.3)
HGB BLD-MCNC: 15.1 G/DL (ref 13.6–17.2)
MCH RBC QN AUTO: 30.1 PG (ref 26.1–32.9)
MCHC RBC AUTO-ENTMCNC: 34.8 G/DL (ref 31.4–35)
MCV RBC AUTO: 86.6 FL (ref 79.6–97.8)
NRBC # BLD: 0.02 K/UL (ref 0–0.2)
P-R INTERVAL, ECG05: 150 MS
PLATELET # BLD AUTO: 290 K/UL (ref 150–450)
PMV BLD AUTO: 9.5 FL (ref 9.4–12.3)
POTASSIUM SERPL-SCNC: 3.6 MMOL/L (ref 3.5–5.1)
Q-T INTERVAL, ECG07: 366 MS
QRS DURATION, ECG06: 84 MS
QTC CALCULATION (BEZET), ECG08: 430 MS
RBC # BLD AUTO: 5.01 M/UL (ref 4.23–5.6)
SODIUM SERPL-SCNC: 144 MMOL/L (ref 136–145)
VENTRICULAR RATE, ECG03: 83 BPM
WBC # BLD AUTO: 6.7 K/UL (ref 4.3–11.1)

## 2021-05-27 PROCEDURE — 85027 COMPLETE CBC AUTOMATED: CPT

## 2021-05-27 PROCEDURE — 70450 CT HEAD/BRAIN W/O DYE: CPT

## 2021-05-27 PROCEDURE — 99285 EMERGENCY DEPT VISIT HI MDM: CPT

## 2021-05-27 PROCEDURE — 80048 BASIC METABOLIC PNL TOTAL CA: CPT

## 2021-05-27 PROCEDURE — 93005 ELECTROCARDIOGRAM TRACING: CPT | Performed by: EMERGENCY MEDICINE

## 2021-05-27 NOTE — ED TRIAGE NOTES
Patient in states he has had a headache and dizziness since yesterday with loss of hearing in left ear. Patient states he received the second dose of the phizer vaccine on Sunday. Patient ambulatory to room without difficulty.

## 2021-05-27 NOTE — ED PROVIDER NOTES
58-year-old gentleman presents with concerns about a headache, dizziness, and deafness in his left ear that started about 48 hours ago. Patient says that he got his second [address] on Sunday and then about 24 to 48 hours later he began having a headache and then he got very dizzy and lost hearing in his left ear. He says he has no significant pain in his ear although his head does hurt all over. He has had no fevers or chills. He has no history of ear problems. Describes the dizziness as feeling like the world is spinning around him. He had no problems with balance or coordination but he says that when he walks he does feel like things are moving. He denies any chest pain and has no difficulty breathing. No other associated symptoms. He does have a history of high blood pressure but did not take his antihypertensive today. Elements of this note were created using speech recognition software. As such, errors of speech recognition may be present.            Past Medical History:   Diagnosis Date    Diverticulosis     scope 2019    HTN (hypertension)     Internal hemorrhoids     scope 2019    Prediabetes 6/14/2018    Tubular adenoma 2019       Past Surgical History:   Procedure Laterality Date    HX SHOULDER ARTHROSCOPY Left          Family History:   Problem Relation Age of Onset    Stroke Mother     Hypertension Father     Coronary Artery Disease Father        Social History     Socioeconomic History    Marital status:      Spouse name: Not on file    Number of children: Not on file    Years of education: Not on file    Highest education level: Not on file   Occupational History    Not on file   Tobacco Use    Smoking status: Never Smoker    Smokeless tobacco: Never Used   Substance and Sexual Activity    Alcohol use: No    Drug use: No    Sexual activity: Yes     Partners: Female   Other Topics Concern    Not on file   Social History Narrative    Not on file Social Determinants of Health     Financial Resource Strain:     Difficulty of Paying Living Expenses:    Food Insecurity:     Worried About Running Out of Food in the Last Year:     [address] in the Last Year:    Transportation Needs:     Lack of Transportation (Medical):  Lack of Transportation (Non-Medical):    Physical Activity:     Days of Exercise per Week:     Minutes of Exercise per Session:    Stress:     Feeling of Stress :    Social Connections:     Frequency of Communication with Friends and Family:     Frequency of Social Gatherings with Friends and Family:     Attends Confucianist Services:     Active Member of Clubs or Organizations:     Attends Club or Organization Meetings:     Marital Status:    Intimate Partner Violence:     Fear of Current or Ex-Partner:     Emotionally Abused:     Physically Abused:     Sexually Abused: ALLERGIES: Patient has no known allergies. Review of Systems   Constitutional: Negative for chills, diaphoresis and fever. HENT: Negative for congestion, rhinorrhea and sore throat. Hearing difficulty in left ear   Eyes: Negative for redness and visual disturbance. Respiratory: Negative for cough, chest tightness, shortness of breath and wheezing. Cardiovascular: Negative for chest pain and palpitations. Gastrointestinal: Negative for abdominal pain, blood in stool, diarrhea, nausea and vomiting. Endocrine: Negative for polydipsia and polyuria. Genitourinary: Negative for dysuria and hematuria. Musculoskeletal: Negative for arthralgias, myalgias and neck stiffness. Skin: Negative for rash. Neurological: Positive for dizziness. Negative for weakness and headaches. Hematological: Negative for adenopathy. Does not bruise/bleed easily.        Vitals:    05/27/21 0804   BP: (!) 192/107   Pulse: 90   Resp: 16   Temp: 98.6 °F (37 °C)   SpO2: 99%   Weight: 90.7 kg (200 lb)   Height: 5' 5\" (1.651 m)            Physical Exam  Vitals and nursing note reviewed. Constitutional:       General: He is not in acute distress. Appearance: He is well-developed. He is not toxic-appearing. HENT:      Head: Normocephalic and atraumatic. Left Ear: Tympanic membrane, ear canal and external ear normal. There is no impacted cerumen. Ears:      Comments: Patient has decreased air and bone conduction hearing in his left ear  Eyes:      General: No scleral icterus. Right eye: No discharge. Left eye: No discharge. Conjunctiva/sclera: Conjunctivae normal.      Pupils: Pupils are equal, round, and reactive to light. Cardiovascular:      Rate and Rhythm: Normal rate and regular rhythm. Heart sounds: Normal heart sounds. Pulmonary:      Effort: Pulmonary effort is normal. No respiratory distress. Breath sounds: Normal breath sounds. No wheezing or rales. Chest:      Chest wall: No tenderness. Abdominal:      General: Bowel sounds are normal. There is no distension. Palpations: Abdomen is soft. Tenderness: There is no guarding or rebound. Musculoskeletal:         General: No tenderness. Normal range of motion. Cervical back: Normal range of motion. No rigidity. Lymphadenopathy:      Cervical: No cervical adenopathy. Skin:     General: Skin is warm and dry. Neurological:      General: No focal deficit present. Mental Status: He is alert and oriented to person, place, and time. Psychiatric:         Mood and Affect: Mood normal.         Behavior: Behavior normal.          MDM  Number of Diagnoses or Management Options  Diagnosis management comments: Patient symptoms may be related to the vaccine as just some uncomfortable side effects. However, given his hearing loss and his headache I will get a CT scan to rule out an occult intracranial abnormality.     EKG review by ER doctor:  Normal sinus rhythm  No acute ischemic ST segment changes  No QTC prolongation  Rate of: 80    ED Course as of May 27 0910   Thu May 27, 2021   0905 Patient's head CT scan and blood work are unremarkable. His pressure has improved significantly just with calming down. I am not certain I can explain his deafness but I do not think anything urgent or emergent. I will refer him to ENT.     [AC]      ED Course User Index  [AC] Dayana Patel MD       Procedures

## 2021-05-27 NOTE — DISCHARGE INSTRUCTIONS
Please return with any fevers, vomiting, arm or leg weakness, balance problems, speech problems, worsening symptoms, or additional concerns. Call the ear nose and throat specialist (otolaryngologist) in 3 or 4 days if her hearing has not returned to normal.      Otherwise, follow-up with your primary care doctor for reevaluation.

## 2021-05-27 NOTE — Clinical Note
[address] [address] EMERGENCY DEPT 
[address] [facility] 
Anish [place] [id number] [phone] Work/School Note Date: 5/27/2021 To Whom It May concern: 
 
Gilmer Crigler was seen and treated today in the emergency room by the following provider(s): 
Attending Provider: Michael Cruz MD. Gilmer Crigler is excused from work/school on 05/27/21 and 05/28/21. He is medically clear to return to work/school on 5/29/2021. Sincerely, Asa Soler MD

## 2021-05-27 NOTE — ED NOTES
I have reviewed discharge instructions with the patient. The patient verbalized understanding. Patient left ED via Discharge Method: ambulatory to Home with self. Opportunity for questions and clarification provided. Patient given 0 scripts. To continue your aftercare when you leave the hospital, you may receive an automated call from our care team to check in on how you are doing. This is a free service and part of our promise to provide the best care and service to meet your aftercare needs.  If you have questions, or wish to unsubscribe from this service please call [phone]. Thank you for Choosing our [facility] Emergency Department.

## 2022-03-18 PROBLEM — J30.1 SEASONAL ALLERGIC RHINITIS DUE TO POLLEN: Status: ACTIVE | Noted: 2018-04-18

## 2022-03-18 PROBLEM — E66.01 SEVERE OBESITY (BMI 35.0-39.9) WITH COMORBIDITY (HCC): Status: ACTIVE | Noted: 2018-04-18

## 2022-03-18 PROBLEM — R73.03 PREDIABETES: Status: ACTIVE | Noted: 2018-06-14

## 2022-03-19 PROBLEM — Z82.49 FAMILY HISTORY OF ISCHEMIC HEART DISEASE (IHD): Status: ACTIVE | Noted: 2017-01-16

## 2022-03-19 PROBLEM — Z00.00 ROUTINE HEALTH MAINTENANCE: Status: ACTIVE | Noted: 2018-06-14

## 2022-03-19 PROBLEM — I10 ESSENTIAL HYPERTENSION: Status: ACTIVE | Noted: 2017-01-16

## 2022-03-19 PROBLEM — R07.2 PRECORDIAL PAIN: Status: ACTIVE | Noted: 2017-01-16

## 2022-03-20 PROBLEM — Z92.89 H/O DIAGNOSTIC TESTS: Status: ACTIVE | Noted: 2017-02-12

## 2022-08-19 DIAGNOSIS — I10 ESSENTIAL (PRIMARY) HYPERTENSION: Primary | ICD-10-CM

## 2022-08-19 RX ORDER — AMLODIPINE BESYLATE 10 MG/1
10 TABLET ORAL DAILY
Qty: 30 TABLET | Refills: 0 | Status: SHIPPED | OUTPATIENT
Start: 2022-08-19 | End: 2022-09-01 | Stop reason: SDUPTHER

## 2022-09-01 ENCOUNTER — TELEMEDICINE (OUTPATIENT)
Dept: FAMILY MEDICINE CLINIC | Facility: CLINIC | Age: 57
End: 2022-09-01
Payer: COMMERCIAL

## 2022-09-01 DIAGNOSIS — I10 ESSENTIAL (PRIMARY) HYPERTENSION: Primary | ICD-10-CM

## 2022-09-01 DIAGNOSIS — R73.03 PREDIABETES: ICD-10-CM

## 2022-09-01 DIAGNOSIS — E66.09 CLASS 1 OBESITY DUE TO EXCESS CALORIES WITHOUT SERIOUS COMORBIDITY IN ADULT, UNSPECIFIED BMI: ICD-10-CM

## 2022-09-01 PROCEDURE — G8427 DOCREV CUR MEDS BY ELIG CLIN: HCPCS | Performed by: FAMILY MEDICINE

## 2022-09-01 PROCEDURE — 99214 OFFICE O/P EST MOD 30 MIN: CPT | Performed by: FAMILY MEDICINE

## 2022-09-01 PROCEDURE — 3017F COLORECTAL CA SCREEN DOC REV: CPT | Performed by: FAMILY MEDICINE

## 2022-09-01 RX ORDER — AMLODIPINE BESYLATE 10 MG/1
10 TABLET ORAL DAILY
Qty: 90 TABLET | Refills: 1 | Status: SHIPPED | OUTPATIENT
Start: 2022-09-01

## 2022-09-01 ASSESSMENT — ENCOUNTER SYMPTOMS
ABDOMINAL PAIN: 0
BLOOD IN STOOL: 0
SHORTNESS OF BREATH: 0
CHEST TIGHTNESS: 0

## 2022-09-01 NOTE — PROGRESS NOTES
Jaleesa Sanders (:  1965) is a Established patient, here for evaluation of the following:    Assessment & Plan   Below is the assessment and plan developed based on review of pertinent history, physical exam, labs, studies, and medications. 1. Essential (primary) hypertension  Probably OK, but does not check Bps at home. He is going to come in to have his labs drawn and BP checked, he agreed to do this within the next month. We will call him later to schedule this. - amLODIPine (NORVASC) 10 MG tablet; Take 1 tablet by mouth daily  Dispense: 90 tablet; Refill: 1  - TSH; Future  - Comprehensive Metabolic Panel; Future    2. Prediabetes  Stable, check A1C and renal function.     - Hemoglobin A1C; Future    3. Class 1 obesity due to excess calories without serious comorbidity in adult, unspecified BMI  Will get a weight on him too. He looks thinner than his listed weight, which means he is either taking better care of himself or his sugar is really bad. Will check that when he comes in for labs. FU 6m if labs OK      Subjective     Pt of Dr. Lui Be with HTN, pre-DM (diet controlled) and obesity is on the line for FU. Has not had routine labs in quite some time, 16 months and no A1C in nearly 2 years. He does not check his BP. BP Readings from Last 3 Encounters:   21 136/89     No in person Bps in over a year. He tends to run high normal on Norvasc 10 when he is taking it. Lab Results   Component Value Date    LABA1C 5.6 2020     Lab Results   Component Value Date     2020     Wt Readings from Last 3 Encounters:   21 201 lb (91.2 kg)   21 203 lb (92.1 kg)           Review of Systems   Constitutional:  Negative for chills and fever. Respiratory:  Negative for chest tightness and shortness of breath. Gastrointestinal:  Negative for abdominal pain and blood in stool. Genitourinary:  Negative for hematuria.         Objective   Patient-Reported Vitals  No data recorded     Physical Exam  [INSTRUCTIONS:  \"[x]\" Indicates a positive item  \"[]\" Indicates a negative item  -- DELETE ALL ITEMS NOT EXAMINED]    Constitutional: [x] Appears well-developed and well-nourished [x] No apparent distress      [] Abnormal -     Mental status: [x] Alert and awake  [x] Oriented to person/place/time [x] Able to follow commands    [] Abnormal -     Eyes:   EOM    [x]  Normal    [] Abnormal -   Sclera  [x]  Normal    [] Abnormal -          Discharge [x]  None visible   [] Abnormal -     HENT: [x] Normocephalic, atraumatic  [] Abnormal -   [x] Mouth/Throat: Mucous membranes are moist    External Ears [x] Normal  [] Abnormal -    Neck: [x] No visualized mass [] Abnormal -     Pulmonary/Chest: [x] Respiratory effort normal   [x] No visualized signs of difficulty breathing or respiratory distress        [] Abnormal -      Musculoskeletal:   [x] Normal gait with no signs of ataxia         [x] Normal range of motion of neck        [] Abnormal -     Neurological:        [x] No Facial Asymmetry (Cranial nerve 7 motor function) (limited exam due to video visit)          [x] No gaze palsy        [] Abnormal -          Skin:        [x] No significant exanthematous lesions or discoloration noted on facial skin         [] Abnormal -            Psychiatric:       [x] Normal Affect [] Abnormal -        [x] No Hallucinations    Other pertinent observable physical exam findings:-             Anneliese Smiley, was evaluated through a synchronous (real-time) audio-video encounter. The patient (or guardian if applicable) is aware that this is a billable service, which includes applicable co-pays. This Virtual Visit was conducted with patient's (and/or legal guardian's) consent. The visit was conducted pursuant to the emergency declaration under the [facility], [address] [place] [address] authority and the [employer] General Act. Patient identification was verified, and a caregiver was present when appropriate. The patient was located at Home: [place] [address] [phone]. Provider was [facility] at [facility] [id number] [facility].         --Esther Saunders MD

## 2022-09-12 ENCOUNTER — NURSE ONLY (OUTPATIENT)
Dept: FAMILY MEDICINE CLINIC | Facility: CLINIC | Age: 57
End: 2022-09-12

## 2022-09-12 VITALS — DIASTOLIC BLOOD PRESSURE: 90 MMHG | SYSTOLIC BLOOD PRESSURE: 138 MMHG

## 2022-09-12 DIAGNOSIS — Z01.30 BLOOD PRESSURE CHECK: Primary | ICD-10-CM

## 2023-04-11 DIAGNOSIS — I10 ESSENTIAL (PRIMARY) HYPERTENSION: ICD-10-CM

## 2023-04-11 RX ORDER — AMLODIPINE BESYLATE 10 MG/1
10 TABLET ORAL DAILY
Qty: 90 TABLET | Refills: 1 | OUTPATIENT
Start: 2023-04-11

## 2023-07-05 ENCOUNTER — TELEPHONE (OUTPATIENT)
Dept: FAMILY MEDICINE CLINIC | Facility: CLINIC | Age: 58
End: 2023-07-05

## 2023-07-05 NOTE — TELEPHONE ENCOUNTER
----- Message from Galagustin Briceño sent at 7/5/2023  3:10 PM EDT -----  Subject: Appointment Request    Reason for Call: Nena Abraham Patient/New to Provider Appointment needed: Routine   Existing Condition Follow Up    QUESTIONS    Reason for appointment request? Available appointments did not meet   patient need     Additional Information for Provider? patient was with Haris but was a no   show-he needs an appt at [address] [place] [address] for med refills-his appt with Haris   was 3/1/23  ---------------------------------------------------------------------------  --------------  Russell ASHRAF  [phone]; OK to leave message on voicemail  ---------------------------------------------------------------------------  --------------  SCRIPT ANSWERS

## 2023-09-21 ENCOUNTER — TELEPHONE (OUTPATIENT)
Dept: FAMILY MEDICINE CLINIC | Facility: CLINIC | Age: 58
End: 2023-09-21

## 2023-09-21 ENCOUNTER — TELEMEDICINE (OUTPATIENT)
Dept: FAMILY MEDICINE CLINIC | Facility: CLINIC | Age: 58
End: 2023-09-21
Payer: COMMERCIAL

## 2023-09-21 DIAGNOSIS — I10 ESSENTIAL (PRIMARY) HYPERTENSION: ICD-10-CM

## 2023-09-21 DIAGNOSIS — I10 HYPERTENSION, UNSPECIFIED TYPE: Primary | ICD-10-CM

## 2023-09-21 DIAGNOSIS — R73.02 IGT (IMPAIRED GLUCOSE TOLERANCE): ICD-10-CM

## 2023-09-21 DIAGNOSIS — E78.5 HYPERLIPIDEMIA, UNSPECIFIED HYPERLIPIDEMIA TYPE: ICD-10-CM

## 2023-09-21 DIAGNOSIS — I10 HYPERTENSION, UNSPECIFIED TYPE: ICD-10-CM

## 2023-09-21 PROBLEM — E66.09 CLASS 1 OBESITY DUE TO EXCESS CALORIES WITHOUT SERIOUS COMORBIDITY IN ADULT: Status: RESOLVED | Noted: 2018-04-18 | Resolved: 2023-09-21

## 2023-09-21 PROBLEM — Z00.00 ROUTINE HEALTH MAINTENANCE: Status: RESOLVED | Noted: 2018-06-14 | Resolved: 2023-09-21

## 2023-09-21 PROBLEM — J30.9 AR (ALLERGIC RHINITIS): Status: ACTIVE | Noted: 2018-04-18

## 2023-09-21 PROBLEM — R07.2 PRECORDIAL PAIN: Status: RESOLVED | Noted: 2017-01-16 | Resolved: 2023-09-21

## 2023-09-21 PROBLEM — Z92.89 H/O DIAGNOSTIC TESTS: Status: RESOLVED | Noted: 2017-02-12 | Resolved: 2023-09-21

## 2023-09-21 LAB
ALBUMIN SERPL-MCNC: 3.8 G/DL (ref 3.2–4.6)
ALBUMIN/GLOB SERPL: 1.1 (ref 0.4–1.6)
ALP SERPL-CCNC: 98 U/L (ref 50–136)
ALT SERPL-CCNC: 46 U/L (ref 12–65)
ANION GAP SERPL CALC-SCNC: 4 MMOL/L (ref 2–11)
AST SERPL-CCNC: 29 U/L (ref 15–37)
BILIRUB SERPL-MCNC: 0.5 MG/DL (ref 0.2–1.1)
BUN SERPL-MCNC: 11 MG/DL (ref 8–23)
CALCIUM SERPL-MCNC: 8.7 MG/DL (ref 8.3–10.4)
CHLORIDE SERPL-SCNC: 109 MMOL/L (ref 101–110)
CHOLEST SERPL-MCNC: 166 MG/DL
CO2 SERPL-SCNC: 29 MMOL/L (ref 21–32)
CREAT SERPL-MCNC: 1.1 MG/DL (ref 0.8–1.5)
EST. AVERAGE GLUCOSE BLD GHB EST-MCNC: 120 MG/DL
GLOBULIN SER CALC-MCNC: 3.4 G/DL (ref 2.8–4.5)
GLUCOSE SERPL-MCNC: 103 MG/DL (ref 65–100)
HBA1C MFR BLD: 5.8 % (ref 4.8–5.6)
HDLC SERPL-MCNC: 48 MG/DL (ref 40–60)
HDLC SERPL: 3.5
LDLC SERPL CALC-MCNC: 93.2 MG/DL
POTASSIUM SERPL-SCNC: 4 MMOL/L (ref 3.5–5.1)
PROT SERPL-MCNC: 7.2 G/DL (ref 6.3–8.2)
SODIUM SERPL-SCNC: 142 MMOL/L (ref 133–143)
TRIGL SERPL-MCNC: 124 MG/DL (ref 35–150)
VLDLC SERPL CALC-MCNC: 24.8 MG/DL (ref 6–23)

## 2023-09-21 PROCEDURE — 3017F COLORECTAL CA SCREEN DOC REV: CPT | Performed by: FAMILY MEDICINE

## 2023-09-21 PROCEDURE — 99215 OFFICE O/P EST HI 40 MIN: CPT | Performed by: FAMILY MEDICINE

## 2023-09-21 PROCEDURE — G8427 DOCREV CUR MEDS BY ELIG CLIN: HCPCS | Performed by: FAMILY MEDICINE

## 2023-09-21 RX ORDER — AMLODIPINE BESYLATE 10 MG/1
10 TABLET ORAL DAILY
Qty: 100 TABLET | Refills: 0 | Status: SHIPPED | OUTPATIENT
Start: 2023-09-21

## 2023-09-21 RX ORDER — AMLODIPINE BESYLATE 10 MG/1
10 TABLET ORAL DAILY
Qty: 90 TABLET | Refills: 1 | Status: CANCELLED | OUTPATIENT
Start: 2023-09-21

## 2023-09-21 SDOH — ECONOMIC STABILITY: FOOD INSECURITY: WITHIN THE PAST 12 MONTHS, THE FOOD YOU BOUGHT JUST DIDN'T LAST AND YOU DIDN'T HAVE MONEY TO GET MORE.: NEVER TRUE

## 2023-09-21 SDOH — ECONOMIC STABILITY: INCOME INSECURITY: HOW HARD IS IT FOR YOU TO PAY FOR THE VERY BASICS LIKE FOOD, HOUSING, MEDICAL CARE, AND HEATING?: NOT HARD AT ALL

## 2023-09-21 SDOH — ECONOMIC STABILITY: FOOD INSECURITY: WITHIN THE PAST 12 MONTHS, YOU WORRIED THAT YOUR FOOD WOULD RUN OUT BEFORE YOU GOT MONEY TO BUY MORE.: NEVER TRUE

## 2023-09-21 SDOH — ECONOMIC STABILITY: HOUSING INSECURITY
IN THE LAST 12 MONTHS, WAS THERE A TIME WHEN YOU DID NOT HAVE A STEADY PLACE TO SLEEP OR SLEPT IN A SHELTER (INCLUDING NOW)?: NO

## 2023-09-21 ASSESSMENT — PATIENT HEALTH QUESTIONNAIRE - PHQ9
SUM OF ALL RESPONSES TO PHQ QUESTIONS 1-9: 0
1. LITTLE INTEREST OR PLEASURE IN DOING THINGS: 0
SUM OF ALL RESPONSES TO PHQ9 QUESTIONS 1 & 2: 0
2. FEELING DOWN, DEPRESSED OR HOPELESS: 0

## 2023-09-21 NOTE — PATIENT INSTRUCTIONS
I am checking one or more labs today related to your health concerns and if any results require a change in your treatment regimen you will be notified right away. - Please know that we keep Urgent Care visit slots in reserve every day for any acute illness or injury. If you ever have an urgent issue please call our office and we should typically be able to see you within 24 hours, but most often you will be able to be seen the same day that you call. We also offer Virtual Visits that can be done over a video connection, or regular phone call if you don't have a video connection, if that is your preference vs an office visit. Finally, please make sure you are scheduling your visits with someone who works at our office, [address] [place] [address], and not scheduling with our \"call center\". When you get the phone tree options, press \"Option 2\" first & then \"Option 1\". This combination should take you directly to someone at our office. Please try to avoid scheduling any visits through our call center if at all possible. 92

## 2023-09-21 NOTE — PROGRESS NOTES
[address], [address]  Phone: [phone]  Fax: [phone]  Email: [email or web address]      Encounter Info  Olayinka Hopson; Established patient 59 y.o.male; seen 9/21/2023 for: Other (Bp concerns/ need refills)      Assessment & Plan    1. Hypertension, unspecified type  -     Comprehensive Metabolic Panel; Future  2. IGT (impaired glucose tolerance)  -     Hemoglobin A1C; Future  -     Comprehensive Metabolic Panel; Future  3. Hyperlipidemia, unspecified hyperlipidemia type  -     Comprehensive Metabolic Panel; Future  -     Lipid Panel; Future    Problem and/or Symptoms are currently not stable and/or well controlled on current treatment plan. Will have patient follow up as directed and make the following changes for further evaluation and/or treatment:     Advised pt to come into office this afternoon for vitals check & pertinent labs, then will either resume prior HTN Tx regimen or start alt med as needed. Pt is long over due for CPX so will plan to do this w/n the next few months once we get him back on a HTN Tx regimen as well. Update: pt came back this afternoon & had BP checked; average of 180 over 110 from readings on both sides. Will resume prior regimen of Amlodipine 10 mg QD & pt needs to schedule a f/u visit w/n 1-2 M for CPX & recheck BP. Check Out Instructions  Return in about 2 months (around 11/21/2023) for CPX/AWV. Subjective & Objective    HPI  Pt is establishing care with new PCP today; has not been seen in over a year nor had any labs in 3+ years. Has been out of his meds for 6+ months & has no idea what his BP has been lately.      Review of Systems     Physical Exam      9/21/2023     4:36 PM   Patient-Reported Vitals   Patient-Reported Systolic 856 mmHg   Patient-Reported Diastolic 176 mmHg       BP Readings from Last 3 Encounters:   09/12/22 (!) 138/90   06/08/21 136/89     There is no height or weight on file to

## 2023-09-21 NOTE — TELEPHONE ENCOUNTER
From his visit note earlier today:    Update: pt came back this afternoon & had BP checked; average of 180 over 110 from readings on both sides. Will resume prior regimen of Amlodipine 10 mg QD & pt needs to schedule a f/u visit w/n 1-2 M for CPX & recheck BP. Please notify pt that I've send in Norvasc script as discussed; leonard.